# Patient Record
Sex: FEMALE | Race: WHITE | HISPANIC OR LATINO | URBAN - METROPOLITAN AREA
[De-identification: names, ages, dates, MRNs, and addresses within clinical notes are randomized per-mention and may not be internally consistent; named-entity substitution may affect disease eponyms.]

---

## 2018-07-01 ENCOUNTER — OUTPATIENT (OUTPATIENT)
Dept: OUTPATIENT SERVICES | Facility: HOSPITAL | Age: 58
LOS: 1 days | End: 2018-07-01
Payer: MEDICAID

## 2018-07-18 DIAGNOSIS — Z71.89 OTHER SPECIFIED COUNSELING: ICD-10-CM

## 2020-05-27 PROBLEM — Z00.00 ENCOUNTER FOR PREVENTIVE HEALTH EXAMINATION: Status: ACTIVE | Noted: 2020-05-27

## 2021-12-01 PROCEDURE — G9005: CPT

## 2024-04-18 ENCOUNTER — EMERGENCY (EMERGENCY)
Facility: HOSPITAL | Age: 64
LOS: 1 days | Discharge: HOME | End: 2024-04-18
Attending: EMERGENCY MEDICINE
Payer: MEDICAID

## 2024-04-18 VITALS
DIASTOLIC BLOOD PRESSURE: 98 MMHG | RESPIRATION RATE: 20 BRPM | TEMPERATURE: 98 F | SYSTOLIC BLOOD PRESSURE: 154 MMHG | WEIGHT: 145.95 LBS | HEART RATE: 82 BPM | OXYGEN SATURATION: 95 %

## 2024-04-18 DIAGNOSIS — R07.89 OTHER CHEST PAIN: ICD-10-CM

## 2024-04-18 DIAGNOSIS — I50.9 HEART FAILURE, UNSPECIFIED: ICD-10-CM

## 2024-04-18 DIAGNOSIS — E78.5 HYPERLIPIDEMIA, UNSPECIFIED: ICD-10-CM

## 2024-04-18 DIAGNOSIS — I10 ESSENTIAL (PRIMARY) HYPERTENSION: ICD-10-CM

## 2024-04-18 LAB
ALBUMIN SERPL ELPH-MCNC: 4.4 G/DL — SIGNIFICANT CHANGE UP (ref 3.3–5.2)
ALP SERPL-CCNC: 94 U/L — SIGNIFICANT CHANGE UP (ref 40–120)
ALT FLD-CCNC: 23 U/L — SIGNIFICANT CHANGE UP
ANION GAP SERPL CALC-SCNC: 12 MMOL/L — SIGNIFICANT CHANGE UP (ref 5–17)
APTT BLD: 47.4 SEC — HIGH (ref 24.5–35.6)
AST SERPL-CCNC: 28 U/L — SIGNIFICANT CHANGE UP
BASOPHILS # BLD AUTO: 0.04 K/UL — SIGNIFICANT CHANGE UP (ref 0–0.2)
BASOPHILS NFR BLD AUTO: 0.7 % — SIGNIFICANT CHANGE UP (ref 0–2)
BILIRUB SERPL-MCNC: 0.4 MG/DL — SIGNIFICANT CHANGE UP (ref 0.4–2)
BUN SERPL-MCNC: 23.6 MG/DL — HIGH (ref 8–20)
CALCIUM SERPL-MCNC: 9.6 MG/DL — SIGNIFICANT CHANGE UP (ref 8.4–10.5)
CHLORIDE SERPL-SCNC: 101 MMOL/L — SIGNIFICANT CHANGE UP (ref 96–108)
CO2 SERPL-SCNC: 28 MMOL/L — SIGNIFICANT CHANGE UP (ref 22–29)
CREAT SERPL-MCNC: 1.2 MG/DL — SIGNIFICANT CHANGE UP (ref 0.5–1.3)
D DIMER BLD IA.RAPID-MCNC: 302 NG/ML DDU — HIGH
EGFR: 51 ML/MIN/1.73M2 — LOW
EOSINOPHIL # BLD AUTO: 0.07 K/UL — SIGNIFICANT CHANGE UP (ref 0–0.5)
EOSINOPHIL NFR BLD AUTO: 1.2 % — SIGNIFICANT CHANGE UP (ref 0–6)
GLUCOSE SERPL-MCNC: 72 MG/DL — SIGNIFICANT CHANGE UP (ref 70–99)
HCT VFR BLD CALC: 47.7 % — HIGH (ref 34.5–45)
HGB BLD-MCNC: 16.1 G/DL — HIGH (ref 11.5–15.5)
IMM GRANULOCYTES NFR BLD AUTO: 1.2 % — HIGH (ref 0–0.9)
INR BLD: 1.01 RATIO — SIGNIFICANT CHANGE UP (ref 0.85–1.18)
LYMPHOCYTES # BLD AUTO: 2.35 K/UL — SIGNIFICANT CHANGE UP (ref 1–3.3)
LYMPHOCYTES # BLD AUTO: 39.8 % — SIGNIFICANT CHANGE UP (ref 13–44)
MAGNESIUM SERPL-MCNC: 2.2 MG/DL — SIGNIFICANT CHANGE UP (ref 1.6–2.6)
MCHC RBC-ENTMCNC: 29.5 PG — SIGNIFICANT CHANGE UP (ref 27–34)
MCHC RBC-ENTMCNC: 33.8 GM/DL — SIGNIFICANT CHANGE UP (ref 32–36)
MCV RBC AUTO: 87.4 FL — SIGNIFICANT CHANGE UP (ref 80–100)
MONOCYTES # BLD AUTO: 0.71 K/UL — SIGNIFICANT CHANGE UP (ref 0–0.9)
MONOCYTES NFR BLD AUTO: 12 % — SIGNIFICANT CHANGE UP (ref 2–14)
NEUTROPHILS # BLD AUTO: 2.67 K/UL — SIGNIFICANT CHANGE UP (ref 1.8–7.4)
NEUTROPHILS NFR BLD AUTO: 45.1 % — SIGNIFICANT CHANGE UP (ref 43–77)
NT-PROBNP SERPL-SCNC: 1379 PG/ML — HIGH (ref 0–300)
PLATELET # BLD AUTO: 231 K/UL — SIGNIFICANT CHANGE UP (ref 150–400)
POTASSIUM SERPL-MCNC: 4 MMOL/L — SIGNIFICANT CHANGE UP (ref 3.5–5.3)
POTASSIUM SERPL-SCNC: 4 MMOL/L — SIGNIFICANT CHANGE UP (ref 3.5–5.3)
PROT SERPL-MCNC: 7.4 G/DL — SIGNIFICANT CHANGE UP (ref 6.6–8.7)
PROTHROM AB SERPL-ACNC: 11.2 SEC — SIGNIFICANT CHANGE UP (ref 9.5–13)
RBC # BLD: 5.46 M/UL — HIGH (ref 3.8–5.2)
RBC # FLD: 14.9 % — HIGH (ref 10.3–14.5)
SODIUM SERPL-SCNC: 141 MMOL/L — SIGNIFICANT CHANGE UP (ref 135–145)
TROPONIN T, HIGH SENSITIVITY RESULT: 18 NG/L — SIGNIFICANT CHANGE UP (ref 0–51)
TROPONIN T, HIGH SENSITIVITY RESULT: 18 NG/L — SIGNIFICANT CHANGE UP (ref 0–51)
WBC # BLD: 5.91 K/UL — SIGNIFICANT CHANGE UP (ref 3.8–10.5)
WBC # FLD AUTO: 5.91 K/UL — SIGNIFICANT CHANGE UP (ref 3.8–10.5)

## 2024-04-18 PROCEDURE — 93010 ELECTROCARDIOGRAM REPORT: CPT

## 2024-04-18 PROCEDURE — 71045 X-RAY EXAM CHEST 1 VIEW: CPT | Mod: 26

## 2024-04-18 PROCEDURE — 99223 1ST HOSP IP/OBS HIGH 75: CPT

## 2024-04-18 RX ORDER — MORPHINE SULFATE 50 MG/1
4 CAPSULE, EXTENDED RELEASE ORAL ONCE
Refills: 0 | Status: DISCONTINUED | OUTPATIENT
Start: 2024-04-18 | End: 2024-04-18

## 2024-04-18 RX ORDER — CARVEDILOL PHOSPHATE 80 MG/1
6.25 CAPSULE, EXTENDED RELEASE ORAL EVERY 12 HOURS
Refills: 0 | Status: DISCONTINUED | OUTPATIENT
Start: 2024-04-18 | End: 2024-04-26

## 2024-04-18 RX ORDER — FUROSEMIDE 40 MG
20 TABLET ORAL ONCE
Refills: 0 | Status: COMPLETED | OUTPATIENT
Start: 2024-04-18 | End: 2024-04-18

## 2024-04-18 RX ORDER — BUDESONIDE AND FORMOTEROL FUMARATE DIHYDRATE 160; 4.5 UG/1; UG/1
2 AEROSOL RESPIRATORY (INHALATION)
Refills: 0 | Status: DISCONTINUED | OUTPATIENT
Start: 2024-04-18 | End: 2024-04-26

## 2024-04-18 RX ORDER — ONDANSETRON 8 MG/1
4 TABLET, FILM COATED ORAL ONCE
Refills: 0 | Status: COMPLETED | OUTPATIENT
Start: 2024-04-18 | End: 2024-04-18

## 2024-04-18 RX ORDER — TRAZODONE HCL 50 MG
50 TABLET ORAL AT BEDTIME
Refills: 0 | Status: DISCONTINUED | OUTPATIENT
Start: 2024-04-18 | End: 2024-04-26

## 2024-04-18 RX ORDER — METHOCARBAMOL 500 MG/1
1000 TABLET, FILM COATED ORAL ONCE
Refills: 0 | Status: COMPLETED | OUTPATIENT
Start: 2024-04-18 | End: 2024-04-19

## 2024-04-18 RX ORDER — SACUBITRIL AND VALSARTAN 24; 26 MG/1; MG/1
1 TABLET, FILM COATED ORAL
Refills: 0 | Status: DISCONTINUED | OUTPATIENT
Start: 2024-04-18 | End: 2024-04-26

## 2024-04-18 RX ORDER — ATORVASTATIN CALCIUM 80 MG/1
20 TABLET, FILM COATED ORAL AT BEDTIME
Refills: 0 | Status: DISCONTINUED | OUTPATIENT
Start: 2024-04-18 | End: 2024-04-19

## 2024-04-18 RX ORDER — ISOSORBIDE MONONITRATE 60 MG/1
30 TABLET, EXTENDED RELEASE ORAL DAILY
Refills: 0 | Status: DISCONTINUED | OUTPATIENT
Start: 2024-04-18 | End: 2024-04-19

## 2024-04-18 RX ORDER — LIDOCAINE 4 G/100G
1 CREAM TOPICAL ONCE
Refills: 0 | Status: DISCONTINUED | OUTPATIENT
Start: 2024-04-18 | End: 2024-04-26

## 2024-04-18 RX ORDER — ASPIRIN/CALCIUM CARB/MAGNESIUM 324 MG
81 TABLET ORAL DAILY
Refills: 0 | Status: DISCONTINUED | OUTPATIENT
Start: 2024-04-18 | End: 2024-04-26

## 2024-04-18 RX ORDER — NICOTINE POLACRILEX 2 MG
1 GUM BUCCAL DAILY
Refills: 0 | Status: DISCONTINUED | OUTPATIENT
Start: 2024-04-18 | End: 2024-04-26

## 2024-04-18 RX ORDER — CYCLOBENZAPRINE HYDROCHLORIDE 10 MG/1
10 TABLET, FILM COATED ORAL AT BEDTIME
Refills: 0 | Status: DISCONTINUED | OUTPATIENT
Start: 2024-04-18 | End: 2024-04-26

## 2024-04-18 RX ADMIN — Medication 20 MILLIGRAM(S): at 21:43

## 2024-04-18 RX ADMIN — SACUBITRIL AND VALSARTAN 1 TABLET(S): 24; 26 TABLET, FILM COATED ORAL at 23:05

## 2024-04-18 RX ADMIN — Medication 200 MILLIGRAM(S): at 23:05

## 2024-04-18 RX ADMIN — Medication 50 MILLIGRAM(S): at 23:05

## 2024-04-18 RX ADMIN — CYCLOBENZAPRINE HYDROCHLORIDE 10 MILLIGRAM(S): 10 TABLET, FILM COATED ORAL at 23:04

## 2024-04-18 RX ADMIN — ONDANSETRON 4 MILLIGRAM(S): 8 TABLET, FILM COATED ORAL at 22:16

## 2024-04-18 RX ADMIN — MORPHINE SULFATE 4 MILLIGRAM(S): 50 CAPSULE, EXTENDED RELEASE ORAL at 19:51

## 2024-04-18 NOTE — ED CDU PROVIDER INITIAL DAY NOTE - OBJECTIVE STATEMENT
64 y/o F with PMH significant for COPD, CAD, HTN, HLD, CHF; now presenting with past pain–left-sided, exertional, radiating to left back and shoulder. ED work-up reviewed and troponin 19 > rpt 18, BNP Elevated. Patient with persisting chest pain and TWI on EKG, placed on observation for cardiology consult. 62 y/o F with PMH significant for COPD, CAD, HTN, HLD, CHF; now presenting with past pain–left-sided, exertional, radiating to left back and shoulder. ED work-up reviewed and troponin 19 > rpt 18, BNP Elevated. [place] [facility] [place] cardiology. Patient placed on observation for persisting chest pain and TWI on EKG, Pending Cardiology consult and recommendation. Upon exam, patient also has pain to right posterior calf. She reports she had the cath insertion site to the right groin and her PMD recommended imaging of Right lower extrem. 62 y/o F with PMH significant for COPD, CAD, HTN, HLD, CHF; now presenting with past pain–left-sided, exertional, radiating to left back and shoulder. ED work-up reviewed and troponin 18 > rpt 18, BNP Elevated. [place] [facility] [place], [place] cardiology. Patient placed on observation for persisting chest pain and TWI on EKG, Pending Cardiology consult and recommendation. Upon exam, patient also has pain to right posterior calf. She reports she had the cath insertion site to the right groin and her PMD recommended imaging of Right lower extrem.

## 2024-04-18 NOTE — ED ADULT TRIAGE NOTE - CHIEF COMPLAINT QUOTE
C/O crushing chest pain that radiates down the left arm. Received 324mg of aspirin. Denies SOB. Hx of MI, cardiac arrest and CHF.

## 2024-04-18 NOTE — ED ADULT NURSE NOTE - OBJECTIVE STATEMENT
patient who reports a PMH of CHF and COPD presents to ED reporting left upper back pain that began x2 days and chest pain that began today.  patient denies heart palpitation and dizziness, but endorses periods of mild SOB. patient reports she was recently admitted at another hospital for similar complaints and has been compliant with medication and follow-ups

## 2024-04-18 NOTE — ED PROVIDER NOTE - NS ED ROS FT
Constitutional: (-) fever  (-)chills  (-)sweats  Eyes/ENT: (-)   Cardiovascular: +) chest pain, (-) palpitations (-) edema   Respiratory: (+) cough, (+ shortness of breath   Gastrointestinal: (-)nausea  (-)vomiting, (-) diarrhea  (-) abdominal pain   :  (-)dysuria, (-)frequency, (-)urgency, (-)hematuria  Musculoskeletal: (-) neck pain, (-) back pain, (-) joint pain  Integumentary: (-) rash, (-) edema  Neurological: (-) headache, (-) altered mental status  (-)LOC

## 2024-04-18 NOTE — ED CDU PROVIDER INITIAL DAY NOTE - CLINICAL SUMMARY MEDICAL DECISION MAKING FREE TEXT BOX
64 y/o F with PMH significant for COPD, CAD, HTN, HLD, CHF; now presenting with past pain–left-sided, exertional, radiating to left back and shoulder. [facility] cardiology consulted for recommendations. Patient on cardiac monitor. Home medications obtained.

## 2024-04-18 NOTE — ED CDU PROVIDER INITIAL DAY NOTE - NSICDXPASTMEDICALHX_GEN_ALL_CORE_FT
PAST MEDICAL HISTORY:  Chronic CHF     COPD, mild     HLD (hyperlipidemia)     HTN (hypertension)

## 2024-04-18 NOTE — ED CDU PROVIDER INITIAL DAY NOTE - NS ED ROS FT
Gen: denies fever, chills, fatigue  Skin: denies rashes, laceration, bruising  HEENT: denies visual changes, ear pain, nasal congestion, throat pain  Respiratory: (+) SOB, no cough, no wheezing  Cardiovascular: (+) chest pain   GI: denies abdominal pain, no n/v  : denies dysuria, frequency, urgency  MSK: denies joint swelling/pain, back pain, neck pain  Neuro: denies headache, dizziness, weakness, numbness

## 2024-04-18 NOTE — ED PROVIDER NOTE - PROGRESS NOTE DETAILS
Jaspreet: Pt received in signout from Dr. Gerber. Delta trop of 0. Will place in observation pending cardiology eval.

## 2024-04-18 NOTE — ED PROVIDER NOTE - OBJECTIVE STATEMENT
63 yoF; with PMH significant for COPD, CAD, HTN, HLD, CHF; now presenting with past pain–left-sided, exertional, nonpleuritic, radiating to left shoulder and back, associated with shortness of breath and palpitations.  Denies nausea or diaphoresis.  Denies numbness or tingling.  Denies abdominal pain.  Reports mild cough.  Denies fever, chills, sweats.     Patient reports recently being admitted to [facility] [place] where she was admitted for an NSTEMI.  Patient had a cardiac catheterization on 3/13/2024 at that time which revealed 50% stenosis of the distal RCA, 20% stenosis of proximal left circumflex, and mild calcific disease of the LAD.  Patient had an echo in March 2024 which showed normal LV size, mild LV systolic dysfunction, LVEF 45%, mild AR, mild MR.  Patient was discharged on torsemide, carvedilol, isosorbide, aspirin 81 mg daily, atorvastatin, Entresto.  Patient followed up with [place] cardiology after discharge.

## 2024-04-18 NOTE — ED PROVIDER NOTE - PHYSICAL EXAMINATION
General:     NAD  Head:     NC/AT, EOMI, oral mucosa moist  Neck:     trachea midline  Lungs:     CTA b/l, no w/r/r  CVS:     S1S2, RRR, no m/g/r  Abd:     +BS, s/nt/nd, no organomegaly  Ext:    2+ radial and pedal pulses, no c/c/e  Neuro: AAOx3, no sensory/motor deficits
no

## 2024-04-18 NOTE — ED PROVIDER NOTE - SHIFT CHANGE DETAILS
Progress Note (MD Buzz): Patient signed out to incoming physician.  All decisions regarding the progression of care will be made at their discretion.

## 2024-04-18 NOTE — CONSULT NOTE ADULT - PROBLEM SELECTOR RECOMMENDATION 2
Euvolemic on exam with elevated p-BNP (1379) As per record, las echo was March 2024 which showed normal LV size, mild LV systolic dysfunction, LVEF 45%, mild AR, mild MR.    Trend p-BNP   Continue Entresto 24-26 mg BID, Torsemide 20 mg OD and Carvedilol 6.25 mg BID with strict parameters  O2 NC PRN   Maintain K+>4 and mag >2  DASH diet  Daily weight monitor  Strict I&Os Monitor

## 2024-04-18 NOTE — ED ADULT NURSE NOTE - CAS EDP DISCH TYPE

## 2024-04-18 NOTE — CONSULT NOTE ADULT - NS ATTEND AMEND GEN_ALL_CORE FT
Patient seen and examined for chest pain   Patient has a hx of CAD s/p LHC at [facility] [place] Cardiac catheterization on 3/13/2024:  50% stenosis of the distal RCA, 20% stenosis of proximal left circumflex, and mild calcific disease of the LAD.    Trop x 2 negative (18-18)  ECG: Sinus rhythm with PVCs. Some T waves abnormalities in the inferior lateral leads that were seen in prior EKGs   Continue home meds with strict parameters. Continue isosorbide 30 mg OD.     NST shows no evidence of ischemia or infarct  Continue Entresto 24-26 mg BID, Torsemide 20 mg OD and Carvedilol 6.25 mg BID with strict parameters Patient seen and examined for chest pain   Patient has a hx of CAD s/p LHC [place] [facility] [place] on 3/13/2024:  50% stenosis of the distal RCA, 20% stenosis of proximal left circumflex, and mild calcific disease of the LAD.    Trop x 2 negative (18-18)  ECG: Sinus rhythm with PVCs. Some T waves abnormalities in the inferior lateral leads that were seen in prior EKGs   Continue home meds with strict parameters. Continue isosorbide 30 mg OD.     NST shows no evidence of ischemia or infarct  Continue Entresto 24-26 mg BID, Torsemide 20 mg OD and Carvedilol 6.25 mg BID with strict parameters  recommend continue with current medical management and will follow up in the office     Kristine Wood D.O. [facility]  Cardiology/Vascular Cardiology -[facility] Cardiology   Telephone # [phone]

## 2024-04-18 NOTE — CONSULT NOTE ADULT - PROBLEM SELECTOR RECOMMENDATION 9
62 yo F with PMHx of COPD (+smoker), CAD, HTN, HLD, and CHF who presented to ED co back pain x 3 days that today radiated to chest and left arm after an episode of emesis this AM. Back pain is TTP and modifies with movements. Pt was admitted to [facility] [place]  for an NSTEMI.  Patient had a cardiac catheterization on 3/13/2024 (as per pt no intervention/stents).   - Cardiac catheterization on 3/13/2024:  50% stenosis of the distal RCA, 20% stenosis of proximal left circumflex, and mild calcific disease of the LAD.    Trop x 2 negative (18-18)  ECG: Sinus rhythm with PVCs. Some T waves abnormalities in the inferior lateral leads that were seen in prior EKGs   Continue home meds with strict parameters. Continue isosorbide 30 mg OD.   Telemonitor   mg x 1  Morphine PRN x chest pain   O2 NC PRN  Trend CE. Trend trops x 3. Serial EKGs  Obtain CPK, CRP, A1C, lipid panel fasting, TFTs, sed rate to ro comorbidities   Obtain Echo to assess structural and functional status. As per note last echo was 3/2024 however unable to see report.   Maintain K+~4 and mag~2  DASH diet  Quit smoking 64 yo F with PMHx of COPD (+smoker), CAD, HTN, HLD, and CHF who presented to ED co back pain x 3 days that today radiated to chest and left arm after an episode of emesis this AM. Back pain is TTP and modifies with movements. [place] [facility] [place].  Patient had a cardiac catheterization on 3/13/2024 (as per pt no intervention/stents).   - Cardiac catheterization on 3/13/2024:  50% stenosis of the distal RCA, 20% stenosis of proximal left circumflex, and mild calcific disease of the LAD.    Trop x 2 negative (18-18)  ECG: Sinus rhythm with PVCs. Some T waves abnormalities in the inferior lateral leads that were seen in prior EKGs   Continue home meds with strict parameters. Continue isosorbide 30 mg OD.   r/o PE  Telemonitor   mg x 1  Morphine PRN x chest pain   O2 NC PRN  Trend CE. Trend trops x 3. Serial EKGs  Obtain D-dimer, CPK, CRP, A1C, lipid panel fasting, TFTs, sed rate to ro comorbidities   Obtain Echo to assess structural and functional status. As per note last echo was 3/2024 however unable to see report.   Maintain K+~4 and mag~2  DASH diet  Quit smoking

## 2024-04-18 NOTE — CONSULT NOTE ADULT - PROBLEM SELECTOR RECOMMENDATION 3
Poor controlled  Continue home meds with strict parameters. Titrate meds as needed   Monitor BP  DASH diet

## 2024-04-18 NOTE — ED CDU PROVIDER INITIAL DAY NOTE - PHYSICAL EXAMINATION
Gen: No acute distress, non toxic female, speaking in complete sentences   HEENT: NC/AT, Mucous membranes moist, Oropharynx without exudates, uvula midline  Eyes: pink conjunctivae, EOMI, PERRL  CV: RRR, nl s1/s2  Resp: CTAB, normal rate and effort  GI: Abdomen soft, NT, ND. No rebound, no guarding  : No CVAT  Neuro: A&O x 3, sensorimotor intact without deficits   MSK: (+) Tender to anterior right foot, with tenderness to posterior right calf to palpation. Pulses intact. ROM intact of right lower extrem. (+) Tender to left upper paraspinal muscles. No midline spine or joint tenderness to palpation, Full ROM ext x 4  Skin: No rashes. intact and perfused  Ambulatory

## 2024-04-18 NOTE — ED PROVIDER NOTE - CLINICAL SUMMARY MEDICAL DECISION MAKING FREE TEXT BOX
63 yoF; with PMH significant for COPD, CAD, HTN, HLD, CHF; now presenting with past pain–left-sided, exertional, nonpleuritic, radiating to left shoulder and back, associated with shortness of breath and palpitations.  Denies nausea or diaphoresis.  Denies numbness or tingling.  Denies abdominal pain.  Reports mild cough. DDx includes but not limited to ACS, NSTEMI, bronchitis, COPD exacerbation.  Will do labs, EKG, cardiac monitor, cardiology consult.  Patient will be placed in observation versus admission dependent on serial tropes.

## 2024-04-18 NOTE — ED CDU PROVIDER INITIAL DAY NOTE - ATTENDING APP SHARED VISIT CONTRIBUTION OF CARE
63y F w/ hx CHF, nonobstructive CAD, COPD, HTN, HLD, presented for chest pain worse with exertion. Initial EKG showing inferolateral TWI, no STEMI. Delta troponin of 0; age-adjusted D-dimer WNL. BNP 1379. No other acute findings on initial workup. Placed in observation for cardiology eval, TTE, tele monitoring.

## 2024-04-19 ENCOUNTER — RESULT REVIEW (OUTPATIENT)
Age: 64
End: 2024-04-19

## 2024-04-19 VITALS
SYSTOLIC BLOOD PRESSURE: 156 MMHG | HEART RATE: 99 BPM | DIASTOLIC BLOOD PRESSURE: 100 MMHG | OXYGEN SATURATION: 97 % | TEMPERATURE: 98 F | RESPIRATION RATE: 18 BRPM

## 2024-04-19 LAB
CHOLEST SERPL-MCNC: 200 MG/DL — HIGH
ERYTHROCYTE [SEDIMENTATION RATE] IN BLOOD: 12 MM/HR — SIGNIFICANT CHANGE UP (ref 0–20)
HDLC SERPL-MCNC: 66 MG/DL — SIGNIFICANT CHANGE UP
LIPID PNL WITH DIRECT LDL SERPL: 79 MG/DL — SIGNIFICANT CHANGE UP
NON HDL CHOLESTEROL: 134 MG/DL — HIGH
NT-PROBNP SERPL-SCNC: 1663 PG/ML — HIGH (ref 0–300)
T4 AB SER-ACNC: 7.2 UG/DL — SIGNIFICANT CHANGE UP (ref 4.5–12)
TRIGL SERPL-MCNC: 276 MG/DL — HIGH
TROPONIN T, HIGH SENSITIVITY RESULT: 15 NG/L — SIGNIFICANT CHANGE UP (ref 0–51)
TSH SERPL-MCNC: 2.03 UIU/ML — SIGNIFICANT CHANGE UP (ref 0.27–4.2)

## 2024-04-19 PROCEDURE — 93005 ELECTROCARDIOGRAM TRACING: CPT

## 2024-04-19 PROCEDURE — 85379 FIBRIN DEGRADATION QUANT: CPT

## 2024-04-19 PROCEDURE — 93017 CV STRESS TEST TRACING ONLY: CPT

## 2024-04-19 PROCEDURE — 93018 CV STRESS TEST I&R ONLY: CPT | Mod: MC

## 2024-04-19 PROCEDURE — 85025 COMPLETE CBC W/AUTO DIFF WBC: CPT

## 2024-04-19 PROCEDURE — 99284 EMERGENCY DEPT VISIT MOD MDM: CPT

## 2024-04-19 PROCEDURE — 93306 TTE W/DOPPLER COMPLETE: CPT | Mod: 26

## 2024-04-19 PROCEDURE — 99239 HOSP IP/OBS DSCHRG MGMT >30: CPT

## 2024-04-19 PROCEDURE — 85652 RBC SED RATE AUTOMATED: CPT

## 2024-04-19 PROCEDURE — 93971 EXTREMITY STUDY: CPT

## 2024-04-19 PROCEDURE — 84436 ASSAY OF TOTAL THYROXINE: CPT

## 2024-04-19 PROCEDURE — C8929: CPT

## 2024-04-19 PROCEDURE — 36415 COLL VENOUS BLD VENIPUNCTURE: CPT

## 2024-04-19 PROCEDURE — 99285 EMERGENCY DEPT VISIT HI MDM: CPT | Mod: 25

## 2024-04-19 PROCEDURE — 84484 ASSAY OF TROPONIN QUANT: CPT

## 2024-04-19 PROCEDURE — 80053 COMPREHEN METABOLIC PANEL: CPT

## 2024-04-19 PROCEDURE — 78452 HT MUSCLE IMAGE SPECT MULT: CPT | Mod: MC

## 2024-04-19 PROCEDURE — 80061 LIPID PANEL: CPT

## 2024-04-19 PROCEDURE — 83735 ASSAY OF MAGNESIUM: CPT

## 2024-04-19 PROCEDURE — 96375 TX/PRO/DX INJ NEW DRUG ADDON: CPT

## 2024-04-19 PROCEDURE — 85730 THROMBOPLASTIN TIME PARTIAL: CPT

## 2024-04-19 PROCEDURE — 71045 X-RAY EXAM CHEST 1 VIEW: CPT

## 2024-04-19 PROCEDURE — 93016 CV STRESS TEST SUPVJ ONLY: CPT | Mod: MC

## 2024-04-19 PROCEDURE — 93971 EXTREMITY STUDY: CPT | Mod: 26,RT

## 2024-04-19 PROCEDURE — 78452 HT MUSCLE IMAGE SPECT MULT: CPT | Mod: 26,MC

## 2024-04-19 PROCEDURE — A9500: CPT

## 2024-04-19 PROCEDURE — 94640 AIRWAY INHALATION TREATMENT: CPT

## 2024-04-19 PROCEDURE — 96374 THER/PROPH/DIAG INJ IV PUSH: CPT

## 2024-04-19 PROCEDURE — G0378: CPT

## 2024-04-19 PROCEDURE — 93010 ELECTROCARDIOGRAM REPORT: CPT

## 2024-04-19 PROCEDURE — 85610 PROTHROMBIN TIME: CPT

## 2024-04-19 PROCEDURE — 83880 ASSAY OF NATRIURETIC PEPTIDE: CPT

## 2024-04-19 PROCEDURE — 84443 ASSAY THYROID STIM HORMONE: CPT

## 2024-04-19 RX ORDER — ATORVASTATIN CALCIUM 80 MG/1
40 TABLET, FILM COATED ORAL AT BEDTIME
Refills: 0 | Status: DISCONTINUED | OUTPATIENT
Start: 2024-04-19 | End: 2024-04-26

## 2024-04-19 RX ORDER — OXYCODONE HYDROCHLORIDE 5 MG/1
5 TABLET ORAL ONCE
Refills: 0 | Status: DISCONTINUED | OUTPATIENT
Start: 2024-04-19 | End: 2024-04-19

## 2024-04-19 RX ORDER — CYCLOBENZAPRINE HYDROCHLORIDE 10 MG/1
1 TABLET, FILM COATED ORAL
Qty: 14 | Refills: 0
Start: 2024-04-19 | End: 2024-04-25

## 2024-04-19 RX ORDER — TIZANIDINE 4 MG/1
2 TABLET ORAL ONCE
Refills: 0 | Status: COMPLETED | OUTPATIENT
Start: 2024-04-19 | End: 2024-04-19

## 2024-04-19 RX ORDER — ISOSORBIDE MONONITRATE 60 MG/1
60 TABLET, EXTENDED RELEASE ORAL DAILY
Refills: 0 | Status: DISCONTINUED | OUTPATIENT
Start: 2024-04-19 | End: 2024-04-26

## 2024-04-19 RX ADMIN — OXYCODONE HYDROCHLORIDE 5 MILLIGRAM(S): 5 TABLET ORAL at 09:06

## 2024-04-19 RX ADMIN — OXYCODONE HYDROCHLORIDE 5 MILLIGRAM(S): 5 TABLET ORAL at 12:00

## 2024-04-19 RX ADMIN — SACUBITRIL AND VALSARTAN 1 TABLET(S): 24; 26 TABLET, FILM COATED ORAL at 05:53

## 2024-04-19 RX ADMIN — ISOSORBIDE MONONITRATE 30 MILLIGRAM(S): 60 TABLET, EXTENDED RELEASE ORAL at 12:58

## 2024-04-19 RX ADMIN — MORPHINE SULFATE 4 MILLIGRAM(S): 50 CAPSULE, EXTENDED RELEASE ORAL at 08:30

## 2024-04-19 RX ADMIN — BUDESONIDE AND FORMOTEROL FUMARATE DIHYDRATE 2 PUFF(S): 160; 4.5 AEROSOL RESPIRATORY (INHALATION) at 08:04

## 2024-04-19 RX ADMIN — METHOCARBAMOL 1000 MILLIGRAM(S): 500 TABLET, FILM COATED ORAL at 02:43

## 2024-04-19 RX ADMIN — CARVEDILOL PHOSPHATE 6.25 MILLIGRAM(S): 80 CAPSULE, EXTENDED RELEASE ORAL at 05:54

## 2024-04-19 RX ADMIN — Medication 20 MILLIGRAM(S): at 05:54

## 2024-04-19 RX ADMIN — TIZANIDINE 2 MILLIGRAM(S): 4 TABLET ORAL at 12:59

## 2024-04-19 RX ADMIN — Medication 200 MILLIGRAM(S): at 05:53

## 2024-04-19 RX ADMIN — Medication 81 MILLIGRAM(S): at 12:58

## 2024-04-19 NOTE — ED CDU PROVIDER DISPOSITION NOTE - PLAN OF CARE
63-year-old female with chest and left shoulder pain in the setting of fibromyalgia, COPD, CHF, CAD with negative workup by cardiology and recommendations by pain management to start Flexeril and pregabalin.  Labs and imaging independently reviewed and interpreted.  Patient to follow with cardiology and pain outpatient.

## 2024-04-19 NOTE — ED CDU PROVIDER DISPOSITION NOTE - ATTENDING CONTRIBUTION TO CARE
placed on observation for extended evaluation of chest pain.  ECG abnormal with T wave inversions in II, III, F, V5-6.  troponin 18 -> 18 -> 15.  Echo demonstrated moderately decreased left ventricular systolic function with an ejection fraction visually estimated at 35 to 40%, with mild (grade 1) left ventricular diastolic dysfunction.  Nuclear stress testing normal  with no evidence of infarction or inducible ischemia.  Seen by pain management for recommendations to treat burning sensation of shoulder and chest.

## 2024-04-19 NOTE — ED CDU PROVIDER SUBSEQUENT DAY NOTE - CLINICAL SUMMARY MEDICAL DECISION MAKING FREE TEXT BOX
Patient on observation for chest pain, radiating to the back. Ultrasound negative for DVT in right lower extrem. Elevated dimer. Pending CT chest and cardiology recommendations.

## 2024-04-19 NOTE — ED ADULT NURSE REASSESSMENT NOTE - NSFALLUNIVINTERV_ED_ALL_ED
Bed/Stretcher in lowest position, wheels locked, appropriate side rails in place/Call bell, personal items and telephone in reach/Instruct patient to call for assistance before getting out of bed/chair/stretcher/Non-slip footwear applied when patient is off stretcher/[place] to call system/Physically safe environment - no spills, clutter or unnecessary equipment/Purposeful proactive rounding/Room/bathroom lighting operational, light cord in reach
Bed/Stretcher in lowest position, wheels locked, appropriate side rails in place/Call bell, personal items and telephone in reach/Instruct patient to call for assistance before getting out of bed/chair/stretcher/Non-slip footwear applied when patient is off stretcher/[place] to call system/Physically safe environment - no spills, clutter or unnecessary equipment/Purposeful proactive rounding/Room/bathroom lighting operational, light cord in reach

## 2024-04-19 NOTE — ED CDU PROVIDER DISPOSITION NOTE - NSFOLLOWUPINSTRUCTIONS_ED_ALL_ED_FT
Please follow-up with your primary care doctor.  Please call for an appointment in the next 48 hours but if you cannot follow-up with your primary care doctor please return to the Emergency Department for any urgent issues.    You were given a copy of the tests performed today.  Please bring the results with you and review them with your primary care doctor.    If you have any worsening of symptoms or any other concerns please return to the Emergency Department immediately.    Please continue taking your home medications as directed.    Chest Pain    Chest pain can be caused by many different conditions which may or may not be dangerous. Causes include heartburn, lung infections, heart attack, blood clot in lungs, skin infections, strain or damage to muscle, cartilage, or bones, etc. In addition to a history and physical examination, an electrocardiogram (ECG) or other lab tests may have been performed to determine the cause of your chest pain. Follow up with your primary care provider or with a cardiologist as instructed.     SEEK IMMEDIATE MEDICAL CARE IF YOU HAVE ANY OF THE FOLLOWING SYMPTOMS: worsening chest pain, coughing up blood, unexplained back/neck/jaw pain, severe abdominal pain, dizziness or lightheadedness, fainting, shortness of breath, sweaty or clammy skin, vomiting, or racing heart beat. These symptoms may represent a serious problem that is an emergency. Do not wait to see if the symptoms will go away. Get medical help right away. Call 911 and do not drive yourself to the hospital.    Chronic Pain    Chronic pain is a complex condition and the Emergency Department is not the ideal place to manage this condition. Prescription painkillers must be written by your primary care provider or a pain management physician. Avoid activities or triggers that exacerbate your pain.    SEEK IMMEDIATE MEDICAL CARE IF YOU HAVE ANY OF THE FOLLOWING SYMPTOMS: severe chest pain, fainting, vomiting blood, dark or bloody stools, or pain different from your chronic pain.

## 2024-04-19 NOTE — ED CDU PROVIDER SUBSEQUENT DAY NOTE - ATTENDING APP SHARED VISIT CONTRIBUTION OF CARE
Seen on rounds.  Reports sharp pain/burning sensation in left shoulder radiating to chest and down back.  Intermittent for the past 4 days with no identifiable trigger and lasting variable periods of time.  History of fibromyalgia, currently on Lyrica.  Unable to take gabapentin due to medication side effects.  Physical exam: Uncomfortable appearing, full range of motion shoulder, no rash to shoulder/upper left back/ upper chest.  labs reviewed.   stress test pending.  Pain management consult requested.

## 2024-04-19 NOTE — ED CDU PROVIDER DISPOSITION NOTE - PROVIDER TOKENS
PROVIDER:[TOKEN:[[id number]],FOLLOWUP:[7-10 Days]],PROVIDER:[TOKEN:[[id number]],FOLLOWUP:[7-10 Days]]

## 2024-04-19 NOTE — ED ADULT NURSE REASSESSMENT NOTE - NS ED NURSE REASSESS COMMENT FT1
assumed care of pt @ 1930 from previous RN Robert EMMANUEL. Pt breathing even and unlabored at this time, no acute distress noted. Pt resting comfortably, awaiting rpt trop and pain medications once verified by pharmacy. Pt able to make needs known, has no complaints at this time. Stretcher in lowest position, wheels locked, call bell within reach. Cardiac monitor maintained. VSS.
report given to OBS MAVIS Nickerson. pt to be moved to A7L. pt breathing even and unlabored at this time.
Assumed care of pt at this time. Pt A&O x 4 presents to ED with chest pain. Pt denies chest pain at time of assessment, but c/o flare up of chronic back pain. Airway patent. Respirations even and unlabored. No JVD or diaphoresis. Dr. Calderon and Dr. Hand made aware of back pain. Pt on telemonitor with . Bed locked and in lowest position. [place] footwear. Call bell within reach. Able to make needs known.
Received patient from [facility] RN.  Pt AxO4, VSS.  Pt denies chest pain/SOB at this time.  Cardio NSR on cardiac monitor.   IV insertion site intact with no sign of infiltration noted .Currently denied chest pain dizziness ,nausea vomiting  or palpitation--. Support provided safety maintain Safety measures taken, bed in low position, call bell within reach, side rails up x2.  Plan of care explained.  Pt verbalized understanding.  Will continue to monitor.
Yes

## 2024-04-19 NOTE — ED CDU PROVIDER SUBSEQUENT DAY NOTE - PHYSICAL EXAMINATION
· Physical Examination: Gen: No acute distress, non toxic female, General: Well appearing female, speaking in complete sentences   HEENT: NC/AT, Mucous membranes moist, Oropharynx without exudates, uvula midline  Eyes: pink conjunctivae, EOMI, PERRL  CV: RRR, nl s1/s2  Resp: CTAB, normal rate and effort  GI: Abdomen soft, NT, ND. No rebound, no guarding  : No CVAT  Neuro: A&O x 3, sensorimotor intact without deficits   MSK: (+) Tender to anterior right foot, with tenderness to posterior right calf to palpation. Pulses intact. ROM intact of right lower extrem. (+) Tender to left upper paraspinal muscles. No midline spine or joint tenderness to palpation, Full ROM ext x 4  Skin: No rashes. intact and perfused  Ambulatory

## 2024-04-19 NOTE — CONSULT NOTE ADULT - SUBJECTIVE AND OBJECTIVE BOX
CC: chest pain    HPI: per chart review:  · HPI Objective Statement: 63 yoF; with PMH significant for COPD, CAD, HTN, HLD, CHF; now presenting with past pain–left-sided, exertional, nonpleuritic, radiating to left shoulder and back, associated with shortness of breath and palpitations.  Denies nausea or diaphoresis.  Denies numbness or tingling.  Denies abdominal pain.  Reports mild cough.  Denies fever, chills, sweats.     	Patient reports recently being admitted to [facility] where she was admitted for an NSTEMI.  Patient had a cardiac catheterization on 3/13/2024 at that time which revealed 50% stenosis of the distal RCA, 20% stenosis of proximal left circumflex, and mild calcific disease of the LAD.  Patient had an echo in March 2024 which showed normal LV size, mild LV systolic dysfunction, LVEF 45%, mild AR, mild MR.  Patient was discharged on torsemide, carvedilol, isosorbide, aspirin 81 mg daily, atorvastatin, Entresto.  Patient followed up with [facility] after discharge.          Analgesic Dosing for past 24 hours reviewed as below:  cyclobenzaprine   10 milliGRAM(s) Oral (04-18-24 @ 23:04)    methocarbamol   1000 milliGRAM(s) Oral (04-19-24 @ 02:43)    morphine  - Injectable   4 milliGRAM(s) IV Push (04-18-24 @ 19:51)    ondansetron Injectable   4 milliGRAM(s) IV Push (04-18-24 @ 22:16)    oxyCODONE    IR   5 milliGRAM(s) Oral (04-19-24 @ 09:06)    pregabalin   200 milliGRAM(s) Oral (04-19-24 @ 05:53)   200 milliGRAM(s) Oral (04-18-24 @ 23:05)    traZODone   50 milliGRAM(s) Oral (04-18-24 @ 23:05)          T(C): 36.4 (04-19-24 @ 08:00), Max: 36.9 (04-18-24 @ 19:21)  HR: 84 (04-19-24 @ 08:00) (76 - 97)  BP: 122/86 (04-19-24 @ 08:00) (122/86 - 187/88)  RR: 20 (04-19-24 @ 08:00) (17 - 20)  SpO2: 94% (04-19-24 @ 08:00) (94% - 100%)        aspirin  chewable 81 milliGRAM(s) Oral daily  atorvastatin 40 milliGRAM(s) Oral at bedtime  budesonide  80 MICROgram(s)/formoterol 4.5 MICROgram(s) Inhaler 2 Puff(s) Inhalation two times a day  carvedilol 6.25 milliGRAM(s) Oral every 12 hours  cyclobenzaprine 10 milliGRAM(s) Oral at bedtime  isosorbide   mononitrate ER Tablet (IMDUR) 30 milliGRAM(s) Oral daily  lidocaine   4% Patch 1 Patch Transdermal once  nicotine - 21 mG/24Hr(s) Patch 1 Patch Transdermal daily  pregabalin 200 milliGRAM(s) Oral two times a day  sacubitril 24 mG/valsartan 26 mG 1 Tablet(s) Oral two times a day  torsemide 20 milliGRAM(s) Oral daily  traZODone 50 milliGRAM(s) Oral at bedtime                          16.1   5.91  )-----------( 231      ( 18 Apr 2024 17:40 )             47.7     04-18    141  |  101  |  23.6<H>  ----------------------------<  72  4.0   |  28.0  |  1.20    Ca    9.6      18 Apr 2024 17:40  Mg     2.2     04-18    TPro  7.4  /  Alb  4.4  /  TBili  0.4  /  DBili  x   /  AST  28  /  ALT  23  /  AlkPhos  94  04-18    PT/INR - ( 18 Apr 2024 20:20 )   PT: 11.2 sec;   INR: 1.01 ratio         PTT - ( 18 Apr 2024 20:20 )  PTT:47.4 sec  Urinalysis Basic - ( 18 Apr 2024 17:40 )    Color: x / Appearance: x / SG: x / pH: x  Gluc: 72 mg/dL / Ketone: x  / Bili: x / Urobili: x   Blood: x / Protein: x / Nitrite: x   Leuk Esterase: x / RBC: x / WBC x   Sq Epi: x / Non Sq Epi: x / Bacteria: x        Pain Service   [phone]
                                    [facility]                                              CARDIOLOGY AT [place] [address]                                             Telephone: [phone]. Fax:[phone]      CARDIOLOGY CONSULTATION NOTE                                                                                             History obtained by: Patient and medical record  Community Cardiologist: [facility]. Pt would like to follow new cardiologist   obtained:  No   Reason for Consultation: Chest pain    Chief complaint:   Patient is a 63y old  Female who presents with a chief complaint of chest pain.    HPI: 64 yo F with PMHx of COPD (+smoker), CAD, HTN, HLD, and CHF who presented to ED co chest pain x 1 days. Pt having back pain x 3 days associated with some DORSEY. Today she had an episode of emesis in the AM and after that, she started having chest pain radiated to L arm.  Pt with second episode of emesis prior to arrival to ED. Denies alleviating / aggravating factors . Pain is described as pressure that is constant 8/10. Patient recently admitted to [facility]  for an NSTEMI.  Patient had a cardiac catheterization on 3/13/2024 ) as per pt no intervention/stents). Denies diaphoresis.  Denies numbness or tingling.  Denies abdominal pain.  Denies fever, chills, sweats.     CARDIAC TESTING: All scripts reviewed. No additional testing    - Cardiac catheterization on 3/13/2024:  50% stenosis of the distal RCA, 20% stenosis of proximal left circumflex, and mild calcific disease of the LAD.      - Echo in March 2024 which showed normal LV size, mild LV systolic dysfunction, LVEF 45%, mild AR, mild MR.      PAST MEDICAL HISTORY  COPD  CAD  HTN  HLD  CHF    PAST SURGICAL HISTORY  Cardiac cath    SOCIAL HISTORY: + smoker since she was 9 yo (now smoking 1 PPD. Cut down from 2 PPD prior to last NSTEMI on 3/2024). + marijuana daily use. + social alcohol.     FAMILY HISTORY:  Denies    Family History of Cardiovascular Disease:  No   Coronary Artery Disease in first degree relative: No   Sudden Cardiac Death in First degree relative: No     HOME MEDICATIONS:   Torsemide 20 mg OD  Carvedilol 6.25 mg BID  isosorbide 30 mg OD  aspirin 81 mg daily  atorvastatin 20 mg  Entresto 24-26 mg BID    CURRENT CARDIAC MEDICATIONS:   carvedilol 6.25 milliGRAM(s) Oral every 12 hours  isosorbide   mononitrate ER Tablet (IMDUR) 30 milliGRAM(s) Oral daily  sacubitril 24 mG/valsartan 26 mG 1 Tablet(s) Oral two times a day  torsemide 20 milliGRAM(s) Oral daily    CURRENT OTHER MEDICATIONS:   budesonide  80 MICROgram(s)/formoterol 4.5 MICROgram(s) Inhaler 2 Puff(s) Inhalation two times a day  cyclobenzaprine 10 milliGRAM(s) Oral at bedtime  methocarbamol 1000 milliGRAM(s) Oral once, Stop order after: 1 Doses  pregabalin 200 milliGRAM(s) Oral two times a day  traZODone 50 milliGRAM(s) Oral at bedtime  aspirin  chewable 81 milliGRAM(s) Oral daily  atorvastatin 20 milliGRAM(s) Oral at bedtime  nicotine - 21 mG/24Hr(s) Patch 1 Patch Transdermal daily    ALLERGIES:   Penicillin  IV contrast  Peanuts     REVIEW OF SYMPTOMS:  CONSTITUTIONAL: No fever, no chills, no weight loss, no weight gain, no fatigue   ENMT:  No vertigo; No sinus or throat pain  NECK: No pain or stiffness  CARDIOVASCULAR: + DORSEY, + chest pain, no syncope/presyncope, no fatigue, no palpitations, no dizziness, no Orthopnea, no Paroxsymal nocturnal dyspnea  RESPIRATORY: + DORSEY, + cough (chronic)  : No dysuria, no hematuria   GI: + nausea, + vomiting,  no diarrhea, no constipation, no abdominal pain  NEURO: No headache, no slurred speech   MUSCULOSKELETAL: No joint pain or swelling; + back and L arm pain  PSYCH: No agitation, no anxiety.    ALL OTHER REVIEW OF SYSTEMS ARE NEGATIVE.    VITAL SIGNS:   T(C): 36.9 (04-18-24 @ 19:21), Max: 36.9 (04-18-24 @ 19:21)  T(F): 98.5 (04-18-24 @ 19:21), Max: 98.5 (04-18-24 @ 19:21)  HR: 88 (04-18-24 @ 19:21) (82 - 91)  BP: 187/88 (04-18-24 @ 19:48) (152/103 - 187/88)  RR: 17 (04-18-24 @ 19:21) (17 - 20)  SpO2: 97% (04-18-24 @ 19:21) (95% - 97%)    PHYSICAL EXAM:   Constitutional: Comfortable . No acute distress.   HEENT: Atraumatic and normocephalic , neck is supple . no JVD.   CNS: A&Ox3. No focal deficits.  Respiratory: unlabored. + wheezing b/l, No rhonchi. No crackles   Cardiovascular: + back pain TTP. RRR normal s1 s2. No murmur. No rubs or gallop.  Gastrointestinal: Soft, non-tender. +Bowel sounds.   Extremities: 2+ Peripheral Pulses, No edema.   Psychiatric: Calm . no agitation.   Skin: Warm and dry    LABS:                         16.1   5.91  )-----------( 231      ( 18 Apr 2024 17:40 )             47.7     04-18    141  |  101  |  23.6<H>  ----------------------------<  72  4.0   |  28.0  |  1.20    Ca    9.6      18 Apr 2024 17:40  Mg     2.2     04-18    TPro  7.4  /  Alb  4.4  /  TBili  0.4  /  DBili  x   /  AST  28  /  ALT  23  /  AlkPhos  94  04-18    PT/INR - ( 18 Apr 2024 20:20 )   PT: 11.2 sec;   INR: 1.01 ratio      PTT - ( 18 Apr 2024 20:20 )  PTT:47.4 sec    ECG: Sinus rhythm with PVCs. Some T waves abnormalities in the inferior lateral leads that were seen in prior EKGs   Prior ECG: Yes    RADIOLOGY & ADDITIONAL STUDIES:       Preliminary evaluation, please await official recommendations     Assessment and recommendations are final when note is signed by the attending.

## 2024-04-19 NOTE — ED CDU PROVIDER DISPOSITION NOTE - CLINICAL COURSE
63-year-old female in observation unit after presenting with chest and left shoulder pain with history of CAD, HTN, CHF, COPD, fibromyalgia.  Negative workup by cardiology.  Pain management consulted with recommendations to start Flexeril and pregabalin.  Patient to follow outpatient with cardiology and pain.

## 2024-04-19 NOTE — ED CDU PROVIDER DISPOSITION NOTE - CARE PROVIDER_API CALL
Kristine Wood  Cardiology  [address] [place] [address]  Phone: [phone]  Fax: [phone]  Follow Up Time: 7-10 Days    Jyoti Rao  Pain Medicine  [address] [place] [address], [place] [address]  Phone: [phone]  Fax: [phone]  Follow Up Time: 7-10 Days

## 2024-04-19 NOTE — CONSULT NOTE ADULT - ASSESSMENT
63F  chest/shoulder/back pain x 4 days  cardio workup pending    cyclobenzaprine 10 milliGRAM(s) Oral at bedtime  lidocaine   Patch 12 hours on, 12 hours off. Max 3 patches on at one time   pregabalin 200 milliGRAM(s) Oral two times a day 63F  chest/shoulder/back pain x 4 days  cardio workup pending    DC cyclobenzaprine 10 milliGRAM(s) Oral at bedtime  start tizandine 2mg po - can trial one dose to see if patient finds relief  lidocaine   Patch 12 hours on, 12 hours off. Max 3 patches on at one time   pregabalin 200 milliGRAM(s) Oral two times a day    May follow up with us as outpatient for Pain Management at:  [facility]  [address], [place] [address]  [phone]  or  [address]; [address]; [place] [address]  [phone]  or   [address] [facility], [place] [address]  [phone]

## 2024-04-19 NOTE — ED CDU PROVIDER DISPOSITION NOTE - CARE PROVIDERS DIRECT ADDRESSES
[email or web address]

## 2024-04-19 NOTE — ED CDU PROVIDER DISPOSITION NOTE - PATIENT PORTAL LINK FT
You can access the FollowMyHealth Patient Portal offered by [facility] by registering at the following website: [email or web address]. By joining [facility]’s FollowMyHealth portal, you will also be able to view your health information using other applications (apps) compatible with our system.